# Patient Record
Sex: FEMALE | Race: WHITE | NOT HISPANIC OR LATINO | Employment: FULL TIME | URBAN - METROPOLITAN AREA
[De-identification: names, ages, dates, MRNs, and addresses within clinical notes are randomized per-mention and may not be internally consistent; named-entity substitution may affect disease eponyms.]

---

## 2018-07-02 ENCOUNTER — OFFICE VISIT (OUTPATIENT)
Dept: URGENT CARE | Facility: CLINIC | Age: 33
End: 2018-07-02
Payer: COMMERCIAL

## 2018-07-02 VITALS
WEIGHT: 209.8 LBS | TEMPERATURE: 98.2 F | SYSTOLIC BLOOD PRESSURE: 118 MMHG | OXYGEN SATURATION: 97 % | HEART RATE: 91 BPM | DIASTOLIC BLOOD PRESSURE: 76 MMHG | HEIGHT: 63 IN | BODY MASS INDEX: 37.17 KG/M2 | RESPIRATION RATE: 16 BRPM

## 2018-07-02 DIAGNOSIS — H92.03 REFERRED OTALGIA OF BOTH EARS: ICD-10-CM

## 2018-07-02 DIAGNOSIS — J01.90 ACUTE RHINOSINUSITIS: ICD-10-CM

## 2018-07-02 PROCEDURE — 99203 OFFICE O/P NEW LOW 30 MIN: CPT | Performed by: EMERGENCY MEDICINE

## 2018-07-02 RX ORDER — DOXYCYCLINE HYCLATE 100 MG
100 TABLET ORAL 2 TIMES DAILY
Qty: 14 TAB | Refills: 0 | Status: SHIPPED | OUTPATIENT
Start: 2018-07-02 | End: 2021-01-06

## 2018-07-02 ASSESSMENT — ENCOUNTER SYMPTOMS
NAUSEA: 0
COUGH: 0
VOMITING: 0
CHILLS: 0
SORE THROAT: 1
SHORTNESS OF BREATH: 0
RHINORRHEA: 0
SINUS PAIN: 1

## 2018-07-02 NOTE — PROGRESS NOTES
"Subjective:      Treasure Hills is a 33 y.o. female who presents with Otalgia (bilateral ear pain X 1.5 weeks )            Otalgia    There is pain in both ears. This is a new problem. Episode onset: over one week. The problem has been waxing and waning. There has been no fever. Associated symptoms include a sore throat. Pertinent negatives include no coughing, ear discharge, hearing loss, rash, rhinorrhea or vomiting. Treatments tried: Mucinex. The treatment provided no relief.   Congestion, postnasal drip sensation over one week. Started with right-sided ear pain, now left-sided. No trauma.      Review of Systems   Constitutional: Negative for chills.   HENT: Positive for congestion, ear pain, sinus pain and sore throat. Negative for ear discharge, hearing loss, nosebleeds and rhinorrhea.    Respiratory: Negative for cough and shortness of breath.    Gastrointestinal: Negative for nausea and vomiting.   Skin: Negative for rash.   Endo/Heme/Allergies: Negative for environmental allergies.     PMH:  has no past medical history of ASTHMA or Diabetes.  MEDS:   Current Outpatient Prescriptions:   •  doxycycline (VIBRAMYCIN) 100 MG Tab, Take 1 Tab by mouth 2 times a day., Disp: 14 Tab, Rfl: 0  •  azithromycin (ZITHROMAX) 250 MG TABS, Take  by mouth every day. 2 tabs by mouth day 1, 1 tab by mouth days 2-5, Disp: 1 Each, Rfl: 0  ALLERGIES:   Allergies   Allergen Reactions   • Pcn [Penicillins] Swelling     SURGHX: History reviewed. No pertinent surgical history.  SOCHX:  reports that she has been smoking.  She has been smoking about 1.00 pack per day. She has never used smokeless tobacco. She reports that she does not drink alcohol or use drugs.  FH: family history is not on file.       Objective:     /76   Pulse 91   Temp 36.8 °C (98.2 °F)   Resp 16   Ht 1.6 m (5' 3\")   Wt 95.2 kg (209 lb 12.8 oz)   SpO2 97%   BMI 37.16 kg/m²      Physical Exam   Constitutional: She appears well-developed and " well-nourished. She is cooperative. She does not appear ill. No distress.   HENT:   Head: Normocephalic.   Right Ear: Hearing, tympanic membrane, external ear and ear canal normal. No mastoid tenderness.   Left Ear: Hearing, tympanic membrane, external ear and ear canal normal. No mastoid tenderness.   Nose: Mucosal edema present. No rhinorrhea, nasal deformity or septal deviation. Right sinus exhibits no maxillary sinus tenderness and no frontal sinus tenderness. Left sinus exhibits maxillary sinus tenderness and frontal sinus tenderness.   Mouth/Throat: Uvula is midline and oropharynx is clear and moist.   Minimal right TMJ tenderness without crepitus.   Eyes: Conjunctivae are normal.   Neck: Trachea normal. Neck supple.   Cardiovascular: Normal rate, regular rhythm and normal heart sounds.    Pulmonary/Chest: Effort normal. She has no decreased breath sounds. She has no wheezes. She has no rhonchi. She has no rales.   Lymphadenopathy:     She has no cervical adenopathy.   Neurological: She is alert.   Skin: Skin is warm and dry.   Psychiatric: She has a normal mood and affect.               Assessment/Plan:     1. Referred otalgia of both ears  OTC analgesia    2. Acute rhinosinusitis  Recommended supportive care measures, including rest, increasing oral fluid intake and use of over-the-counter medications for relief of symptoms.  Based on duration:  - doxycycline (VIBRAMYCIN) 100 MG Tab; Take 1 Tab by mouth 2 times a day.  Dispense: 14 Tab; Refill: 0

## 2018-07-02 NOTE — PATIENT INSTRUCTIONS
Use over-the-counter cetirizine (Zyrtec), fexofenadine (Allegra), or loratadine (Claritin) as needed for relief of symptoms. Use behind-the-counter pseudoephedrine (Sudafed) oral decongestant as needed; avoid bedtime use.  Use over-the-counter pain reliever, such as acetaminophen (Tylenol), ibuprofen (Advil, Motrin) or naproxen (Aleve) as needed; follow package directions for dosing.  Use the prescribed antibiotic if symptoms persist beyond 10-14 days, or re-worsen. Use an oral probiotic daily, such as Culturelle, Align, or yogurt to reduce gastrointestinal symptoms.  Otalgia  Otalgia is pain in or around the ear. When the pain is from the ear itself it is called primary otalgia. Pain may also be coming from somewhere else, like the head and neck. This is called secondary otalgia.   CAUSES   Causes of primary otalgia include:  · Middle ear infection.  · It can also be caused by injury to the ear or infection of the ear canal (swimmer's ear). Swimmer's ear causes pain, swelling and often drainage from the ear canal.  Causes of secondary otalgia include:  · Sinus infections.  · Allergies and colds that cause stuffiness of the nose and tubes that drain the ears (eustachian tubes).  · Dental problems like cavities, gum infections or teething.  · Sore Throat (tonsillitis and pharyngitis).  · Swollen glands in the neck.  · Infection of the bone behind the ear (mastoiditis).  · TMJ discomfort (problems with the joint between your jaw and your skull).  · Other problems such as nerve disorders, circulation problems, heart disease and tumors of the head and neck can also cause symptoms of ear pain. This is rare.  DIAGNOSIS   Evaluation, Diagnosis and Testing:  · Examination by your medical caregiver is recommended to evaluate and diagnose the cause of otalgia.  · Further testing or referral to a specialist may be indicated if the cause of the ear pain is not found and the symptom persists.  TREATMENT   · Your doctor may  prescribe antibiotics if an ear infection is diagnosed.  · Pain relievers and topical analgesics may be recommended.  · It is important to take all medications as prescribed.  HOME CARE INSTRUCTIONS   · It may be helpful to sleep with the painful ear in the up position.  · A warm compress over the painful ear may provide relief.  · A soft diet and avoiding gum may help while ear pain is present.  SEEK IMMEDIATE MEDICAL CARE IF:  · You develop severe pain, a high fever, repeated vomiting or dehydration.  · You develop extreme dizziness, headache, confusion, ringing in the ears (tinnitus) or hearing loss.  Document Released: 01/25/2006 Document Revised: 03/11/2013 Document Reviewed: 10/27/2010  [employer]® Patient Information ©2014 [employer].

## 2021-01-06 ENCOUNTER — OFFICE VISIT (OUTPATIENT)
Dept: URGENT CARE | Facility: CLINIC | Age: 36
End: 2021-01-06
Payer: COMMERCIAL

## 2021-01-06 ENCOUNTER — HOSPITAL ENCOUNTER (OUTPATIENT)
Facility: MEDICAL CENTER | Age: 36
End: 2021-01-06
Attending: NURSE PRACTITIONER
Payer: COMMERCIAL

## 2021-01-06 VITALS
SYSTOLIC BLOOD PRESSURE: 106 MMHG | HEART RATE: 97 BPM | BODY MASS INDEX: 39.34 KG/M2 | RESPIRATION RATE: 14 BRPM | HEIGHT: 63 IN | DIASTOLIC BLOOD PRESSURE: 68 MMHG | TEMPERATURE: 97.1 F | WEIGHT: 222 LBS | OXYGEN SATURATION: 97 %

## 2021-01-06 DIAGNOSIS — H66.001 NON-RECURRENT ACUTE SUPPURATIVE OTITIS MEDIA OF RIGHT EAR WITHOUT SPONTANEOUS RUPTURE OF TYMPANIC MEMBRANE: ICD-10-CM

## 2021-01-06 DIAGNOSIS — R09.81 NASAL CONGESTION: ICD-10-CM

## 2021-01-06 LAB
COVID ORDER STATUS COVID19: NORMAL
SARS-COV-2 RNA RESP QL NAA+PROBE: NOTDETECTED
SPECIMEN SOURCE: NORMAL

## 2021-01-06 PROCEDURE — U0005 INFEC AGEN DETEC AMPLI PROBE: HCPCS

## 2021-01-06 PROCEDURE — U0003 INFECTIOUS AGENT DETECTION BY NUCLEIC ACID (DNA OR RNA); SEVERE ACUTE RESPIRATORY SYNDROME CORONAVIRUS 2 (SARS-COV-2) (CORONAVIRUS DISEASE [COVID-19]), AMPLIFIED PROBE TECHNIQUE, MAKING USE OF HIGH THROUGHPUT TECHNOLOGIES AS DESCRIBED BY CMS-2020-01-R: HCPCS

## 2021-01-06 PROCEDURE — 99204 OFFICE O/P NEW MOD 45 MIN: CPT | Performed by: NURSE PRACTITIONER

## 2021-01-06 RX ORDER — LORATADINE PSEUDOEPHEDRINE SULFATE 10; 240 MG/1; MG/1
1 TABLET, EXTENDED RELEASE ORAL DAILY
Qty: 30 TAB | COMMUNITY
Start: 2021-01-06

## 2021-01-06 RX ORDER — DOXYCYCLINE HYCLATE 100 MG
100 TABLET ORAL 2 TIMES DAILY
Qty: 14 TAB | Refills: 0 | Status: SHIPPED | OUTPATIENT
Start: 2021-01-06 | End: 2021-01-13

## 2021-01-06 ASSESSMENT — ENCOUNTER SYMPTOMS
VOMITING: 0
RHINORRHEA: 1
DIARRHEA: 0
WHEEZING: 0
CHILLS: 0
HEADACHES: 0
FEVER: 0
SHORTNESS OF BREATH: 0
COUGH: 0
SORE THROAT: 0
NAUSEA: 0

## 2021-01-06 NOTE — LETTER
January 6, 2021         Patient: Treasure Hills   YOB: 1985   Date of Visit: 1/6/2021     To Whom it May Concern:    Treasure Hills was seen in my clinic on 1/6/2021.   A concern for COVID-19 has been identified and testing is in progress. The results are available through our electronic delivery system called [employer].      We are asking employers to excuse absences while they follow self-isolation protocol per CDC guidelines:   • At least 10 days since symptoms first appeared and   • At least 24 hours with no fever greater than 100.4 F without fever-reducing medication and   • Symptoms have improved    If results are negative, you can end isolation if symptoms have improved and you have not had a fever in the last 24 hours. You should continue to self isolate per CDC guidelines if you have had direct close contact with someone who's tested positive for COVID-19 (someone you live with is positive, or you were within 6 feet of someone who has COVID-19 for a total of 15 minutes or more). Repeat testing is not offered through our urgent care.     If the results of testing are positive then you will be contacted by your [facility] health department for further instructions on duration of self-isolation and return to work. In general, this will also follow the CDC guidelines with a minimum of 10 days from the onset of symptoms and symptoms are improving without fever.      This is the only note that will be provided from [facility] for this visit. Please schedule a visit with a primary care provider if documents for FMLA, disability, or unemployment are required.    If you have any questions or concerns, please don't hesitate to call.        Sincerely,       AYDE Ríos.  Electronically Signed

## 2021-01-06 NOTE — PROGRESS NOTES
Subjective:     Tre[place] is a 35 y.o. female who presents for Otalgia (x 1 days on bilateral ears.  She said she noticed blood in the R ear when she cleaned it this am.  Denies fever or chills. )      [place] pain coming [place]. Runny nose. Ear always have bad drainage. Blood i right ear. Pain increase with cold air. Nyquil for stuffy nose. No N/V/D. No body aches. No changes in taste or smell. Does not feel like a cold.     Otalgia   There is pain in both ears. This is a new problem. The current episode started in the past 7 days. The problem has been gradually worsening. There has been no fever. The pain is at a severity of 4/10. The pain is moderate. Associated symptoms include ear discharge and rhinorrhea. Pertinent negatives include no coughing, diarrhea, headaches, hearing loss, sore throat or vomiting. The treatment provided mild relief. There is no history of a chronic ear infection.       History reviewed. No pertinent past medical history.    History reviewed. No pertinent surgical history.    Social History     Socioeconomic History   • Marital status: Single     Spouse name: Not on file   • Number of children: Not on file   • Years of education: Not on file   • Highest education level: Not on file   Occupational History   • Not on file   Social Needs   • Financial resource strain: Not on file   • Food insecurity     Worry: Not on file     Inability: Not on file   • Transportation needs     Medical: Not on file     Non-medical: Not on file   Tobacco Use   • Smoking status: Current Every Day Smoker     Packs/day: 1.00   • Smokeless tobacco: Never Used   Substance and Sexual Activity   • Alcohol use: No   • Drug use: No   • Sexual activity: Yes     Partners: Male   Lifestyle   • Physical activity     Days per week: Not on file     Minutes per session: Not on file   • Stress: Not on file   Relationships   • Social connections     Talks on phone: Not on file     Gets together: Not  "on file     Attends [employer] service: Not on file     Active member of club or organization: Not on file     Attends meetings of clubs or organizations: Not on file     Relationship status: Not on file   • Intimate partner violence     Fear of current or ex partner: Not on file     Emotionally abused: Not on file     Physically abused: Not on file     Forced sexual activity: Not on file   Other Topics Concern   • Not on file   Social History Narrative   • Not on file        History reviewed. No pertinent family history.     Allergies   Allergen Reactions   • Amoxicillin Anaphylaxis   • Pcn [Penicillins] Swelling       Review of Systems   Constitutional: Negative for chills and fever.   HENT: Positive for congestion, ear discharge, ear pain and rhinorrhea. Negative for hearing loss, sore throat and tinnitus.    Respiratory: Negative for cough, shortness of breath and wheezing.    Gastrointestinal: Negative for diarrhea, nausea and vomiting.   Neurological: Negative for headaches.   Endo/Heme/Allergies: Negative for environmental allergies.   All other systems reviewed and are negative.       Objective:   /68   Pulse 97   Temp 36.2 °C (97.1 °F) (Temporal)   Resp 14   Ht 1.6 m (5' 3\")   Wt 100.7 kg (222 lb)   LMP 01/20/2020   SpO2 97%   BMI 39.33 kg/m²     Physical Exam  Vitals signs reviewed.   Constitutional:       General: She is not in acute distress.     Appearance: She is well-developed. She is not toxic-appearing.   HENT:      Head: Normocephalic and atraumatic.      Right Ear: Ear canal and external ear normal. No drainage, swelling or tenderness. A middle ear effusion is present. There is no impacted cerumen. No mastoid tenderness. No hemotympanum. Tympanic membrane is bulging. Tympanic membrane is not injected, perforated or erythematous.      Left Ear: Ear canal and external ear normal. No drainage, swelling or tenderness. A middle ear effusion is present. There is no impacted cerumen. No " mastoid tenderness. No hemotympanum. Tympanic membrane is not injected, perforated or erythematous.      Ears:      Comments: Cloudy purulent effusion right ear. No blood noted. Small amount of cerumen. TM intact. No abrasion in canal.      Nose: Mucosal edema and congestion present.      Right Sinus: No maxillary sinus tenderness or frontal sinus tenderness.      Left Sinus: No maxillary sinus tenderness or frontal sinus tenderness.      Mouth/Throat:      Lips: Pink.      Mouth: Mucous membranes are moist.      Pharynx: Oropharynx is clear. No oropharyngeal exudate or posterior oropharyngeal erythema.   Eyes:      Conjunctiva/sclera: Conjunctivae normal.   Neck:      Musculoskeletal: Normal range of motion and neck supple. No neck rigidity.   Cardiovascular:      Rate and Rhythm: Normal rate.   Pulmonary:      Effort: Pulmonary effort is normal. No respiratory distress.   Musculoskeletal: Normal range of motion.   Lymphadenopathy:      Head:      Right side of head: No submental, submandibular, tonsillar, preauricular, posterior auricular or occipital adenopathy.      Left side of head: No submental, submandibular, tonsillar, preauricular, posterior auricular or occipital adenopathy.   Skin:     General: Skin is warm and dry.      Findings: No rash.   Neurological:      General: No focal deficit present.      Mental Status: She is alert and oriented to person, place, and time.      GCS: GCS eye subscore is 4. GCS verbal subscore is 5. GCS motor subscore is 6.   Psychiatric:         Mood and Affect: Mood normal.         Speech: Speech normal.         Behavior: Behavior normal.         Thought Content: Thought content normal.         Judgment: Judgment normal.         Assessment/Plan:   1. Non-recurrent acute suppurative otitis media of right ear without spontaneous rupture of tympanic membrane  - doxycycline (VIBRAMYCIN) 100 MG Tab; Take 1 Tab by mouth 2 times a day for 7 days.  Dispense: 14 Tab; Refill: 0  -  loratadine-pseudoephedrine (CLARITIN-D 24 HOUR)  MG per tablet; Take 1 Tab by mouth every day.  Dispense: 30 Tab    2. Nasal congestion  - COVID/SARS COV-2 PCR; Future  - loratadine-pseudoephedrine (CLARITIN-D 24 HOUR)  MG per tablet; Take 1 Tab by mouth every day.  Dispense: 30 Tab  -Take antibiotic as directed.  -Oral hydration.  -Ibuprofen or tylenol as directed for pain and/or fevers.   -Follow up with primary care provider.    Follow up for failure to improve after 48 to 72 hours of antibiotic therapy, worsening symptoms, persistent fevers, ear drainage, persistent or increased pain, headache or stiff neck, or any other concerns.    Discussed testing for COVID with nasal congestion, viral etiology of COVID with S&S, and self isolation guidelines.     Differential diagnosis, natural history, supportive care, and indications for immediate follow-up discussed.

## 2021-01-06 NOTE — PATIENT INSTRUCTIONS
Otitis Media, Adult    Otitis media occurs when there is inflammation and fluid in the middle ear. Your middle ear is a part of the ear that contains bones for hearing as well as air that helps send sounds to your brain.  What are the causes?  This condition is caused by a blockage in the eustachian tube. This tube drains fluid from the ear to the back of the nose (nasopharynx). A blockage in this tube can be caused by an object or by swelling (edema) in the tube. Problems that can cause a blockage include:  · A cold or other upper respiratory infection.  · Allergies.  · An irritant, such as tobacco smoke.  · Enlarged adenoids. The adenoids are areas of soft tissue located high in the back of the throat, behind the nose and the roof of the mouth.  · A mass in the nasopharynx.  · Damage to the ear caused by pressure changes (barotrauma).  What are the signs or symptoms?  Symptoms of this condition include:  · Ear pain.  · A fever.  · Decreased hearing.  · A headache.  · Tiredness (lethargy).  · Fluid leaking from the ear.  · Ringing in the ear.  How is this diagnosed?  This condition is diagnosed with a physical exam. During the exam your health care provider will use an instrument called an otoscope to look into your ear and check for redness, swelling, and fluid. He or she will also ask about your symptoms.  Your health care provider may also order tests, such as:  · A test to check the movement of the eardrum (pneumatic otoscopy). This test is done by squeezing a small amount of air into the ear.  · A test that changes air pressure in the middle ear to check how well the eardrum moves and whether the eustachian tube is working (tympanogram).  How is this treated?  This condition usually goes away on its own within 3-5 days. But if the condition is caused by a bacteria infection and does not go away own its own, or keeps coming back, your health care provider may:  · Prescribe antibiotic medicines to treat the  infection.  · Prescribe or recommend medicines to control pain.  Follow these instructions at home:  · Take over-the-counter and prescription medicines only as told by your health care provider.  · If you were prescribed an antibiotic medicine, take it as told by your health care provider. Do not stop taking the antibiotic even if you start to feel better.  · Keep all follow-up visits as told by your health care provider. This is important.  Contact a health care provider if:  · You have bleeding from your nose.  · There is a lump on your neck.  · You are not getting better in 5 days.  · You feel worse instead of better.  Get help right away if:  · You have severe pain that is not controlled with medicine.  · You have swelling, redness, or pain around your ear.  · You have stiffness in your neck.  · A part of your face is paralyzed.  · The bone behind your ear (mastoid) is tender when you touch it.  · You develop a severe headache.  Summary  · Otitis media is redness, soreness, and swelling of the middle ear.  · This condition usually goes away on its own within 3-5 days.  · If the problem does not go away in 3-5 days, your health care provider may prescribe or recommend medicines to treat your symptoms.  · If you were prescribed an antibiotic medicine, take it as told by your health care provider.  This information is not intended to replace advice given to you by your health care provider. Make sure you discuss any questions you have with your health care provider.  Document Released: 09/22/2005 Document Revised: 11/30/2018 Document Reviewed: 12/08/2017  [employer] Patient Education © 2020 [employer]    Eustachian Tube Dysfunction    Eustachian tube dysfunction refers to a condition in which a blockage develops in the narrow passage that connects the middle ear to the back of the nose (eustachian tube). The eustachian tube regulates air pressure in the middle ear by letting air move between the ear and nose. It  also helps to drain fluid from the middle ear space.  Eustachian tube dysfunction can affect one or both ears. When the eustachian tube does not function properly, air pressure, fluid, or both can build up in the middle ear.  What are the causes?  This condition occurs when the eustachian tube becomes blocked or cannot open normally. Common causes of this condition include:  · Ear infections.  · Colds and other infections that affect the nose, mouth, and throat (upper respiratory tract).  · Allergies.  · Irritation from cigarette smoke.  · Irritation from stomach acid coming up into the esophagus (gastroesophageal reflux). The esophagus is the tube that carries food from the mouth to the stomach.  · Sudden changes in air pressure, such as from descending in an airplane or scuba diving.  · Abnormal growths in the nose or throat, such as:  ? Growths that line the nose (nasal polyps).  ? Abnormal growth of cells (tumors).  ? Enlarged tissue at the back of the throat (adenoids).  What increases the risk?  You are more likely to develop this condition if:  · You smoke.  · You are overweight.  · You are a child who has:  ? Certain birth defects of the mouth, such as cleft palate.  ? Large tonsils or adenoids.  What are the signs or symptoms?  Common symptoms of this condition include:  · A feeling of fullness in the ear.  · Ear pain.  · Clicking or popping noises in the ear.  · Ringing in the ear.  · Hearing loss.  · Loss of balance.  · Dizziness.  Symptoms may get worse when the air pressure around you changes, such as when you travel to an area of high elevation, fly on an airplane, or go scuba diving.  How is this diagnosed?  This condition may be diagnosed based on:  · Your symptoms.  · A physical exam of your ears, nose, and throat.  · Tests, such as those that measure:  ? The movement of your eardrum (tympanogram).  ? Your hearing (audiometry).  How is this treated?  Treatment depends on the cause and severity of  "your condition.  · In mild cases, you may relieve your symptoms by moving air into your ears. This is called \"popping the ears.\"  · In more severe cases, or if you have symptoms of fluid in your ears, treatment may include:  ? Medicines to relieve congestion (decongestants).  ? Medicines that treat allergies (antihistamines).  ? Nasal sprays or ear drops that contain medicines that reduce swelling (steroids).  ? A procedure to drain the fluid in your eardrum (myringotomy). In this procedure, a small tube is placed in the eardrum to:  § Drain the fluid.  § Restore the air in the middle ear space.  ? A procedure to insert a balloon device through the nose to inflate the opening of the eustachian tube (balloon dilation).  Follow these instructions at home:  Lifestyle  · Do not do any of the following until your health care provider approves:  ? Travel to high altitudes.  ? Fly in airplanes.  ? Work in a pressurized cabin or room.  ? Scuba dive.  · Do not use any products that contain nicotine or tobacco, such as cigarettes and e-cigarettes. If you need help quitting, ask your health care provider.  · Keep your ears dry. Wear fitted earplugs during showering and bathing. Dry your ears completely after.  General instructions  · Take over-the-counter and prescription medicines only as told by your health care provider.  · Use techniques to help pop your ears as recommended by your health care provider. These may include:  ? Chewing gum.  ? Yawning.  ? Frequent, forceful swallowing.  ? Closing your mouth, holding your nose closed, and gently blowing as if you are trying to blow air out of your nose.  · Keep all follow-up visits as told by your health care provider. This is important.  Contact a health care provider if:  · Your symptoms do not go away after treatment.  · Your symptoms come back after treatment.  · You are unable to pop your ears.  · You have:  ? A fever.  ? Pain in your ear.  ? Pain in your head or " neck.  ? Fluid draining from your ear.  · Your hearing suddenly changes.  · You become very dizzy.  · You lose your balance.  Summary  · Eustachian tube dysfunction refers to a condition in which a blockage develops in the eustachian tube.  · It can be caused by ear infections, allergies, inhaled irritants, or abnormal growths in the nose or throat.  · Symptoms include ear pain, hearing loss, or ringing in the ears.  · Mild cases are treated with maneuvers to unblock the ears, such as yawning or ear popping.  · Severe cases are treated with medicines. Surgery may also be done (rare).  This information is not intended to replace advice given to you by your health care provider. Make sure you discuss any questions you have with your health care provider.  Document Released: 01/13/2017 Document Revised: 04/09/2019 Document Reviewed: 04/09/2019  [employer] Patient Education © 2020 [employer]    INSTRUCTIONS FOR COVID-19 OR ANY OTHER INFECTIOUS RESPIRATORY ILLNESSES    The Centers for Disease Control and Prevention (CDC) states that early indications for COVID-19 include cough, shortness of breath, difficulty breathing, or at least two of the following symptoms: chills, shaking with chills, muscle pain, headache, sore throat, and loss of taste or smell. Symptoms can range from mild to severe and may appear up to two weeks after exposure to the virus.    The practice of self-isolation and quarantine helps protect the public and your family by  preventing exposure to people who have or may have a contagious disease. Please follow the prevention steps below as based on CDC guidelines:    WHEN TO STOP ISOLATION: Persons with COVID-19 or any other infectious respiratory illness who have symptoms and were advised to care for themselves at home may discontinue home isolation under the following conditions:  · At least 24 hours have passed since recovery defined as resolution of fever without the use of fever-reducing  medications; AND,  · Improvement in respiratory symptoms (e.g., cough, shortness of breath); AND,  · At least 10 days have passed since symptoms first appeared and have had no subsequent illness.    MONITOR YOUR SYMPTOMS: If your illness is worsening, seek prompt medical attention. If you have a medical emergency and need to call 911, notify the dispatch personnel that you have, or are being evaluated for confirmed or suspected COVID-19 or another infectious respiratory illness. Wear a facemask if possible.    ACTIVITY RESTRICTION: restrict activities outside your home, except for getting medical care. Do not go to work, school, or public areas. Avoid using public transportation, ride-sharing, or taxis.    SCHEDULED MEDICAL APPOINTMENTS: Notify your provider that you have, or are being evaluated for, confirmed or suspected COVID-19 or another infectious respiratory. This will help the healthcare provider’s office safely take care of you and keep other people from getting exposed or infected.    FACEMASKS, when to wear: Anytime you are away from your home or around other people or pets. If you are unable to wear one, maintain a minimum of 6 feet distancing from others.    LIVING ENVIRONMENT: Stay in a separate room from other people and pets. If possible, use a separate bathroom, have someone else care for your pets and avoid sharing household items. Any items used should be washed thoroughly with soap and water. Clean all “high-touch” surfaces every day. Use a household cleaning spray or wipe, according to the label instructions. High touch surfaces include (but are not limited to) counters, tabletops, doorknobs, bathroom fixtures, toilets, phones, keyboards, tablets, and bedside tables.     HAND WASHING: Frequently wash hands with soap and water for at least 20 seconds,  especially after blowing your nose, coughing, or sneezing; going to the bathroom; before and after interacting with pets; and before and after  eating or preparing food. If hands are visibly dirty use soap and water. If soap and water are not available, use an alcohol-based hand  with at least 60% alcohol. Avoid touching your eyes, nose, and mouth with unwashed hands. Cover your coughs and sneezes with a tissue. Throw used tissues in a lined trash can. Immediately wash your hands.    ACTIVE/FACILITATED SELF-MONITORING: Follow instructions provided by your local health department or health professionals, as appropriate. When working with your local health department check their available hours.    [facility]   Phone Number   [place] [phone]   [place] [phone]   [place] [address]   [place] [phone]     IF YOU HAVE CONFIRMED POSITIVE COVID-19:    Those who have completely recovered from COVID-19 may have immune-boosting antibodies in their plasma--called “convalescent plasma”--that could be used to treat critically ill COVID19 patients.    [facility] is excited to begin working with [facility] on collecting convalescent plasma from  people who have recovered from COVID-19 as part of a program to treat patients infected with the virus. This FDA-approved “emergency investigational new drug” is a special blood product containing antibodies that may give patients an extra boost to fight the virus.    To be eligible to donate convalescent plasma, you must have a prior COVID-19 diagnosis documented by a laboratory test (or a positive test result for SARS-CoV-2 antibodies) and meet additional eligibility requirements.    If you are interested in donating convalescent plasma or have any additional questions, please contact the [facility] Convalescent Plasma  at [phone] or via e-mail at [email or web address].

## 2021-07-31 ENCOUNTER — HOSPITAL ENCOUNTER (OUTPATIENT)
Dept: LAB | Facility: MEDICAL CENTER | Age: 36
End: 2021-07-31
Attending: OBSTETRICS & GYNECOLOGY
Payer: COMMERCIAL

## 2021-07-31 LAB
HBV SURFACE AG SER QL: NORMAL
HCV AB SER QL: NORMAL
HIV 1+2 AB+HIV1 P24 AG SERPL QL IA: NORMAL
TREPONEMA PALLIDUM IGG+IGM AB [PRESENCE] IN SERUM OR PLASMA BY IMMUNOASSAY: NORMAL

## 2021-07-31 PROCEDURE — 36415 COLL VENOUS BLD VENIPUNCTURE: CPT

## 2021-07-31 PROCEDURE — 86780 TREPONEMA PALLIDUM: CPT

## 2021-07-31 PROCEDURE — 87340 HEPATITIS B SURFACE AG IA: CPT

## 2021-07-31 PROCEDURE — 87389 HIV-1 AG W/HIV-1&-2 AB AG IA: CPT

## 2021-07-31 PROCEDURE — 86803 HEPATITIS C AB TEST: CPT

## 2021-08-16 ENCOUNTER — OFFICE VISIT (OUTPATIENT)
Dept: URGENT CARE | Facility: PHYSICIAN GROUP | Age: 36
End: 2021-08-16
Payer: COMMERCIAL

## 2021-08-16 ENCOUNTER — HOSPITAL ENCOUNTER (OUTPATIENT)
Facility: MEDICAL CENTER | Age: 36
End: 2021-08-16
Attending: FAMILY MEDICINE
Payer: COMMERCIAL

## 2021-08-16 VITALS
SYSTOLIC BLOOD PRESSURE: 92 MMHG | HEIGHT: 64 IN | TEMPERATURE: 98 F | HEART RATE: 76 BPM | OXYGEN SATURATION: 97 % | RESPIRATION RATE: 20 BRPM | DIASTOLIC BLOOD PRESSURE: 78 MMHG | BODY MASS INDEX: 35 KG/M2 | WEIGHT: 205 LBS

## 2021-08-16 DIAGNOSIS — Z20.822 SUSPECTED COVID-19 VIRUS INFECTION: ICD-10-CM

## 2021-08-16 PROCEDURE — U0003 INFECTIOUS AGENT DETECTION BY NUCLEIC ACID (DNA OR RNA); SEVERE ACUTE RESPIRATORY SYNDROME CORONAVIRUS 2 (SARS-COV-2) (CORONAVIRUS DISEASE [COVID-19]), AMPLIFIED PROBE TECHNIQUE, MAKING USE OF HIGH THROUGHPUT TECHNOLOGIES AS DESCRIBED BY CMS-2020-01-R: HCPCS

## 2021-08-16 PROCEDURE — 99213 OFFICE O/P EST LOW 20 MIN: CPT | Mod: CS | Performed by: FAMILY MEDICINE

## 2021-08-16 PROCEDURE — U0005 INFEC AGEN DETEC AMPLI PROBE: HCPCS

## 2021-08-16 RX ORDER — ALBUTEROL SULFATE 90 UG/1
2 AEROSOL, METERED RESPIRATORY (INHALATION) EVERY 6 HOURS PRN
Qty: 8.5 G | Refills: 0 | Status: SHIPPED | OUTPATIENT
Start: 2021-08-16

## 2021-08-16 ASSESSMENT — ENCOUNTER SYMPTOMS
VOMITING: 0
SHORTNESS OF BREATH: 1
COUGH: 1
FEVER: 0
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
## 2021-08-17 DIAGNOSIS — Z20.822 SUSPECTED COVID-19 VIRUS INFECTION: ICD-10-CM

## 2021-08-17 LAB
COVID ORDER STATUS COVID19: NORMAL
SARS-COV-2 RNA RESP QL NAA+PROBE: DETECTED
SPECIMEN SOURCE: ABNORMAL

## 2023-12-18 ENCOUNTER — OFFICE VISIT (OUTPATIENT)
Dept: URGENT CARE | Facility: PHYSICIAN GROUP | Age: 38
End: 2023-12-18
Payer: COMMERCIAL

## 2023-12-18 ENCOUNTER — APPOINTMENT (OUTPATIENT)
Dept: RADIOLOGY | Facility: IMAGING CENTER | Age: 38
End: 2023-12-18
Attending: FAMILY MEDICINE
Payer: COMMERCIAL

## 2023-12-18 VITALS
BODY MASS INDEX: 35.92 KG/M2 | OXYGEN SATURATION: 97 % | DIASTOLIC BLOOD PRESSURE: 80 MMHG | SYSTOLIC BLOOD PRESSURE: 118 MMHG | HEIGHT: 64 IN | TEMPERATURE: 97.2 F | HEART RATE: 76 BPM | RESPIRATION RATE: 18 BRPM | WEIGHT: 210.4 LBS

## 2023-12-18 DIAGNOSIS — J06.9 VIRAL URI WITH COUGH: ICD-10-CM

## 2023-12-18 DIAGNOSIS — J10.1 INFLUENZA A (H1N1): ICD-10-CM

## 2023-12-18 LAB
FLUAV RNA SPEC QL NAA+PROBE: POSITIVE
FLUBV RNA SPEC QL NAA+PROBE: NEGATIVE
RSV RNA SPEC QL NAA+PROBE: NEGATIVE
S PYO DNA SPEC NAA+PROBE: NOT DETECTED
SARS-COV-2 RNA RESP QL NAA+PROBE: NEGATIVE

## 2023-12-18 PROCEDURE — 99215 OFFICE O/P EST HI 40 MIN: CPT | Performed by: FAMILY MEDICINE

## 2023-12-18 PROCEDURE — 3079F DIAST BP 80-89 MM HG: CPT | Performed by: FAMILY MEDICINE

## 2023-12-18 PROCEDURE — 71046 X-RAY EXAM CHEST 2 VIEWS: CPT | Mod: TC,FY | Performed by: RADIOLOGY

## 2023-12-18 PROCEDURE — 87651 STREP A DNA AMP PROBE: CPT | Performed by: FAMILY MEDICINE

## 2023-12-18 PROCEDURE — 0241U POCT CEPHEID COV-2, FLU A/B, RSV - PCR: CPT | Performed by: FAMILY MEDICINE

## 2023-12-18 PROCEDURE — 3074F SYST BP LT 130 MM HG: CPT | Performed by: FAMILY MEDICINE

## 2023-12-18 RX ORDER — OSELTAMIVIR PHOSPHATE 75 MG/1
75 CAPSULE ORAL 2 TIMES DAILY
Qty: 10 CAPSULE | Refills: 0 | Status: SHIPPED | OUTPATIENT
Start: 2023-12-18 | End: 2023-12-23

## 2023-12-18 RX ORDER — BENZONATATE 200 MG/1
200 CAPSULE ORAL 3 TIMES DAILY PRN
Qty: 45 CAPSULE | Refills: 0 | Status: SHIPPED | OUTPATIENT
Start: 2023-12-18

## 2023-12-18 NOTE — PROGRESS NOTES
"Chief Complaint   Patient presents with    Flu Like Symptoms     X  4 days, cough, body ache, fatigue, sore throat and upset stomach. Flu exposure            Chief Complaint   Patient presents with    Flu Like Symptoms     X  4 days, cough, body ache, fatigue, sore throat and upset stomach. Flu exposure          Cough  This is a new problem. The current episode started 3 d  ago. The problem has been unchanged. The problem occurs constantly. The cough is dry. Associated symptoms include : fatigue, headaches, chills, muscle aches, fever. Pertinent negatives include no   nausea, vomiting, diarrhea, sweats, weight loss or wheezing. Nothing aggravates the symptoms.  Patient has tried nothing for the symptoms. There is no history of asthma.        No past medical history on file.      Social History     Tobacco Use    Smoking status: Every Day     Current packs/day: 1.00     Types: Cigarettes    Smokeless tobacco: Never   Vaping Use    Vaping Use: Never used   Substance Use Topics    Alcohol use: No    Drug use: No           No family history on file.                 Review of Systems   Constitutional: positive for fever and chills  HENT: negative for otalgia, sore throat  Cardiovascular - denies chest pain or dyspnea  Respiratory: Positive for cough.  .  Negative for wheezing.    Neurological: Negative for headaches, dizziness   GI - denies nausea, vomiting or diarrhea   - denies dysuria, discharge  Psych - denies depression, anxiety  Neuro - denies numbness or tingling.   10 point ROS otherwise negative, except per HPI             Objective:     /80   Pulse 76   Temp 36.2 °C (97.2 °F) (Temporal)   Resp 18   Ht 1.626 m (5' 4\")   Wt 95.4 kg (210 lb 6.4 oz)   SpO2 97%       Physical Exam   Constitutional: patient is oriented to person, place, and time. Patient appears well-developed and well-nourished. No distress.   HENT:   Head: Normocephalic and atraumatic.   Right Ear: External ear normal.   Left Ear: " External ear normal.   TMs normal  Nose: Mucosal edema  present. Right sinus exhibits no maxillary sinus tenderness. Left sinus exhibits no maxillary sinus tenderness.   Mouth/Throat: Mucous membranes are normal. No oral lesions.  No posterior pharyngeal erythema.  No oropharyngeal exudate or posterior oropharyngeal edema.   Eyes: Conjunctivae and EOM are normal. Pupils are equal, round, and reactive to light. Right eye exhibits no discharge. Left eye exhibits no discharge. No scleral icterus.   Neck: Normal range of motion. Neck supple. No tracheal deviation present.   Cardiovascular: Normal rate, regular rhythm and normal heart sounds.  Exam reveals no friction rub.    Pulmonary/Chest: Effort normal. No respiratory distress. Patient has no wheezes or rhonchi. Patient has no rales.    Musculoskeletal:  exhibits no edema.   Lymphadenopathy:     Patient has no cervical adenopathy.      Neurological: patient is alert and oriented to person, place, and time.   Skin: Skin is warm and dry. No rash noted. No erythema.   Psychiatric: patient  has a normal mood and affect.  behavior is normal.   Nursing note and vitals reviewed.          Assesment/Plan:    PCR Positive for Influenza A    Chest x-ray was personally interpreted and reviewed. No acute cardiopulmonary findings. No pulmonary infiltrates or densities. Cardiac silhouette is normal. No hemidiaphragm elevation. No bony abnormalities.       - benzonatate (TESSALON) 200 MG capsule; Take 1 Capsule by mouth 3 times a day as needed for Cough.  Dispense: 45 Capsule; Refill: 0  - oseltamivir (TAMIFLU) 75 MG Cap; Take 1 Capsule by mouth 2 times a day for 5 days.  Dispense: 10 Capsule; Refill: 0      Differential diagnosis, natural history, supportive care, and indications for immediate follow-up discussed. All questions answered. Patient agrees with the plan of care.     Follow-up as needed if symptoms worsen or fail to improve to PCP, Urgent care or Emergency Room.     I  have personally reviewed prior external notes and test results pertinent to today's visit.  I have independently reviewed and interpreted all diagnostics ordered during this urgent care acute visit.         My total time spent caring for the patient on the day of the encounter was 40 minutes.   This does not include time spent on separately billable procedures/tests.